# Patient Record
Sex: OTHER/UNKNOWN | URBAN - METROPOLITAN AREA
[De-identification: names, ages, dates, MRNs, and addresses within clinical notes are randomized per-mention and may not be internally consistent; named-entity substitution may affect disease eponyms.]

---

## 2019-06-19 ENCOUNTER — APPOINTMENT (OUTPATIENT)
Age: 74
Setting detail: DERMATOLOGY
End: 2019-06-22

## 2019-06-19 VITALS
SYSTOLIC BLOOD PRESSURE: 119 MMHG | DIASTOLIC BLOOD PRESSURE: 63 MMHG | HEIGHT: 71 IN | WEIGHT: 190 LBS | HEART RATE: 61 BPM

## 2019-06-19 DIAGNOSIS — D17 BENIGN LIPOMATOUS NEOPLASM: ICD-10-CM

## 2019-06-19 PROBLEM — I10 ESSENTIAL (PRIMARY) HYPERTENSION: Status: ACTIVE | Noted: 2019-06-19

## 2019-06-19 PROBLEM — M12.9 ARTHROPATHY, UNSPECIFIED: Status: ACTIVE | Noted: 2019-06-19

## 2019-06-19 PROBLEM — J45.909 UNSPECIFIED ASTHMA, UNCOMPLICATED: Status: ACTIVE | Noted: 2019-06-19

## 2019-06-19 PROBLEM — E13.9 OTHER SPECIFIED DIABETES MELLITUS WITHOUT COMPLICATIONS: Status: ACTIVE | Noted: 2019-06-19

## 2019-06-19 PROBLEM — H91.90 UNSPECIFIED HEARING LOSS, UNSPECIFIED EAR: Status: ACTIVE | Noted: 2019-06-19

## 2019-06-19 PROBLEM — D17.0 BENIGN LIPOMATOUS NEOPLASM OF SKIN AND SUBCUTANEOUS TISSUE OF HEAD, FACE AND NECK: Status: ACTIVE | Noted: 2019-06-19

## 2019-06-19 PROCEDURE — 99202 OFFICE O/P NEW SF 15 MIN: CPT

## 2019-06-19 PROCEDURE — OTHER OBSERVATION: OTHER

## 2019-06-19 PROCEDURE — OTHER COUNSELING: OTHER

## 2019-06-19 PROCEDURE — OTHER MIPS QUALITY: OTHER

## 2019-06-19 ASSESSMENT — LOCATION SIMPLE DESCRIPTION DERM: LOCATION SIMPLE: TRAPEZIAL NECK

## 2019-06-19 ASSESSMENT — LOCATION DETAILED DESCRIPTION DERM: LOCATION DETAILED: MID TRAPEZIAL NECK

## 2019-06-19 ASSESSMENT — LOCATION ZONE DERM: LOCATION ZONE: NECK

## 2019-06-19 NOTE — PROCEDURE: MIPS QUALITY
Quality 111:Pneumonia Vaccination Status For Older Adults: Pneumococcal Vaccination Previously Received
Quality 226: Preventive Care And Screening: Tobacco Use: Screening And Cessation Intervention: Patient screened for tobacco use and is an ex/non-smoker
Detail Level: Detailed
Quality 47: Advance Care Plan: Advance Care Planning discussed and documented; advance care plan or surrogate decision maker documented in the medical record.
Quality 110: Preventive Care And Screening: Influenza Immunization: Influenza Immunization Administered during Influenza season
Quality 130: Documentation Of Current Medications In The Medical Record: Current Medications Documented

## 2019-12-18 ENCOUNTER — APPOINTMENT (OUTPATIENT)
Age: 74
Setting detail: DERMATOLOGY
End: 2019-12-18

## 2019-12-18 VITALS
WEIGHT: 190 LBS | HEIGHT: 70 IN | DIASTOLIC BLOOD PRESSURE: 90 MMHG | HEART RATE: 61 BPM | SYSTOLIC BLOOD PRESSURE: 158 MMHG

## 2019-12-18 DIAGNOSIS — D17 BENIGN LIPOMATOUS NEOPLASM: ICD-10-CM

## 2019-12-18 PROBLEM — D17.0 BENIGN LIPOMATOUS NEOPLASM OF SKIN AND SUBCUTANEOUS TISSUE OF HEAD, FACE AND NECK: Status: ACTIVE | Noted: 2019-12-18

## 2019-12-18 PROCEDURE — OTHER OBSERVATION: OTHER

## 2019-12-18 PROCEDURE — OTHER COUNSELING: OTHER

## 2019-12-18 PROCEDURE — OTHER MIPS QUALITY: OTHER

## 2019-12-18 PROCEDURE — 99214 OFFICE O/P EST MOD 30 MIN: CPT

## 2019-12-18 ASSESSMENT — LOCATION ZONE DERM: LOCATION ZONE: NECK

## 2019-12-18 ASSESSMENT — LOCATION SIMPLE DESCRIPTION DERM: LOCATION SIMPLE: TRAPEZIAL NECK

## 2019-12-18 ASSESSMENT — LOCATION DETAILED DESCRIPTION DERM: LOCATION DETAILED: MID TRAPEZIAL NECK
